# Patient Record
Sex: FEMALE | Race: WHITE | NOT HISPANIC OR LATINO | Employment: FULL TIME | URBAN - METROPOLITAN AREA
[De-identification: names, ages, dates, MRNs, and addresses within clinical notes are randomized per-mention and may not be internally consistent; named-entity substitution may affect disease eponyms.]

---

## 2023-12-12 ENCOUNTER — HOSPITAL ENCOUNTER (EMERGENCY)
Facility: HOSPITAL | Age: 58
Discharge: HOME | End: 2023-12-12
Payer: COMMERCIAL

## 2023-12-12 VITALS
BODY MASS INDEX: 28.25 KG/M2 | RESPIRATION RATE: 16 BRPM | WEIGHT: 180 LBS | HEIGHT: 67 IN | SYSTOLIC BLOOD PRESSURE: 158 MMHG | DIASTOLIC BLOOD PRESSURE: 101 MMHG | HEART RATE: 97 BPM | OXYGEN SATURATION: 93 % | TEMPERATURE: 99.9 F

## 2023-12-12 DIAGNOSIS — R51.9 ACUTE NONINTRACTABLE HEADACHE, UNSPECIFIED HEADACHE TYPE: ICD-10-CM

## 2023-12-12 DIAGNOSIS — U07.1 COVID: Primary | ICD-10-CM

## 2023-12-12 LAB
FLUAV RNA RESP QL NAA+PROBE: NOT DETECTED
FLUBV RNA RESP QL NAA+PROBE: NOT DETECTED
SARS-COV-2 RNA RESP QL NAA+PROBE: DETECTED

## 2023-12-12 PROCEDURE — 87636 SARSCOV2 & INF A&B AMP PRB: CPT | Performed by: STUDENT IN AN ORGANIZED HEALTH CARE EDUCATION/TRAINING PROGRAM

## 2023-12-12 PROCEDURE — 99283 EMERGENCY DEPT VISIT LOW MDM: CPT

## 2023-12-12 RX ORDER — NAPROXEN 500 MG/1
500 TABLET ORAL
Qty: 30 TABLET | Refills: 0 | Status: SHIPPED | OUTPATIENT
Start: 2023-12-12 | End: 2023-12-27

## 2023-12-12 RX ORDER — METOCLOPRAMIDE 10 MG/1
10 TABLET ORAL EVERY 8 HOURS PRN
Qty: 15 TABLET | Refills: 0 | Status: SHIPPED | OUTPATIENT
Start: 2023-12-12 | End: 2023-12-17

## 2023-12-12 ASSESSMENT — COLUMBIA-SUICIDE SEVERITY RATING SCALE - C-SSRS
1. IN THE PAST MONTH, HAVE YOU WISHED YOU WERE DEAD OR WISHED YOU COULD GO TO SLEEP AND NOT WAKE UP?: NO
2. HAVE YOU ACTUALLY HAD ANY THOUGHTS OF KILLING YOURSELF?: NO
6. HAVE YOU EVER DONE ANYTHING, STARTED TO DO ANYTHING, OR PREPARED TO DO ANYTHING TO END YOUR LIFE?: NO

## 2023-12-12 ASSESSMENT — PAIN DESCRIPTION - DESCRIPTORS: DESCRIPTORS: ACHING

## 2023-12-12 ASSESSMENT — PAIN DESCRIPTION - LOCATION: LOCATION: HEAD

## 2023-12-12 ASSESSMENT — PAIN - FUNCTIONAL ASSESSMENT: PAIN_FUNCTIONAL_ASSESSMENT: 0-10

## 2023-12-12 ASSESSMENT — PAIN SCALES - GENERAL: PAINLEVEL_OUTOF10: 10 - WORST POSSIBLE PAIN

## 2023-12-12 NOTE — ED TRIAGE NOTES
PT. STATES STARTED WITH HEADACHE ON SATURDAY, STATES GOT BETTER AND THEN WORSE. PT. STATES HAD DIFFICULTY SLEEPING LAST NIGHT DUE TO PAIN. PT. WITH NASAL CONGESTION IN TRIAGE. DENIES VISION CHANGES.

## 2023-12-12 NOTE — ED PROVIDER NOTES
HPI   Chief Complaint   Patient presents with    Headache     PT. STATES STARTED WITH HEADACHE ON SATURDAY, STATES GOT BETTER AND THEN WORSE. PT. STATES HAD DIFFICULTY SLEEPING LAST NIGHT DUE TO PAIN. PT. WITH NASAL CONGESTION IN TRIAGE. DENIES VISION CHANGES.        Patient is a 58-year-old female with past medical history of GERD and migraine who presents ED today due to a headache.  Patient also complains of congestion and cough for the past 4 days.  Patient denies any recent sick contacts.  Patient denies any chest pain, shortness of breath, palpitations, lower extremity swelling, chills, nausea, vomiting, diarrhea, constipation, abdominal pain, dizziness, focal numbness or weakness.      History provided by:  Patient   used: No                        Iveth Coma Scale Score: 15                  Patient History   Past Medical History:   Diagnosis Date    Personal history of other diseases of the digestive system     History of gastroesophageal reflux (GERD)     Past Surgical History:   Procedure Laterality Date     SECTION, CLASSIC  2018     Section     No family history on file.  Social History     Tobacco Use    Smoking status: Every Day     Packs/day: .5     Types: Cigarettes    Smokeless tobacco: Never   Vaping Use    Vaping Use: Never used   Substance Use Topics    Alcohol use: Yes     Comment: SOCIALLY    Drug use: Never       Physical Exam   ED Triage Vitals [23 0826]   Temp Heart Rate Resp BP   37.7 °C (99.9 °F) 97 16 (!) 158/101      SpO2 Temp Source Heart Rate Source Patient Position   93 % Temporal Monitor Sitting      BP Location FiO2 (%)     Right arm --       Physical Exam  Vitals and nursing note reviewed.   Constitutional:       General: She is not in acute distress.     Appearance: She is well-developed.   HENT:      Head: Normocephalic and atraumatic.      Right Ear: Tympanic membrane normal.      Left Ear: Tympanic membrane normal.      Nose:  Congestion and rhinorrhea present. Rhinorrhea is clear.      Mouth/Throat:      Mouth: Mucous membranes are moist.      Pharynx: Oropharynx is clear. Uvula midline.      Tonsils: No tonsillar exudate or tonsillar abscesses.   Eyes:      Conjunctiva/sclera: Conjunctivae normal.   Cardiovascular:      Rate and Rhythm: Normal rate and regular rhythm.      Heart sounds: No murmur heard.  Pulmonary:      Effort: Pulmonary effort is normal. No respiratory distress.      Breath sounds: Normal breath sounds.   Abdominal:      Palpations: Abdomen is soft.      Tenderness: There is no abdominal tenderness.   Musculoskeletal:         General: No swelling.      Cervical back: Neck supple.   Skin:     General: Skin is warm and dry.      Capillary Refill: Capillary refill takes less than 2 seconds.   Neurological:      Mental Status: She is alert.   Psychiatric:         Mood and Affect: Mood normal.         ED Course & MDM   Diagnoses as of 12/12/23 1027   COVID   Acute nonintractable headache, unspecified headache type       Medical Decision Making  Differential diagnosis: Migraine headache, tension headache, viral illness, COVID, influenza.  Patient's vital signs have been stable throughout her visit in the emergency department, mildly hypertensive.  Patient's testing was positive for COVID.  I did offer patient treatment in the ED, she would prefer not to wait, would prefer home medications. Patient was prescription for medications for symptomatic relief including oral Reglan and anti-inflammatories for pain.  Patient advised to follow-up with her PCP.   Patient verbalized understanding of these instructions.    Amount and/or Complexity of Data Reviewed  Labs: ordered. Decision-making details documented in ED Course.    Risk  OTC drugs.  Prescription drug management.        Procedure  Procedures     BONG [id number]  12/12/23 1027